# Patient Record
Sex: FEMALE | Race: WHITE | URBAN - METROPOLITAN AREA
[De-identification: names, ages, dates, MRNs, and addresses within clinical notes are randomized per-mention and may not be internally consistent; named-entity substitution may affect disease eponyms.]

---
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
## 2024-05-01 ENCOUNTER — APPOINTMENT (OUTPATIENT)
Dept: URBAN - METROPOLITAN AREA CLINIC 247 | Age: 19
Setting detail: DERMATOLOGY
End: 2024-05-01

## 2024-05-01 DIAGNOSIS — L74.51 PRIMARY FOCAL HYPERHIDROSIS: ICD-10-CM

## 2024-05-01 DIAGNOSIS — L70.0 ACNE VULGARIS: ICD-10-CM

## 2024-05-01 PROBLEM — L74.519 PRIMARY FOCAL HYPERHIDROSIS, UNSPECIFIED: Status: ACTIVE | Noted: 2024-05-01

## 2024-05-01 PROCEDURE — OTHER COUNSELING: OTHER

## 2024-05-01 PROCEDURE — OTHER PRESCRIPTION MEDICATION MANAGEMENT: OTHER

## 2024-05-01 PROCEDURE — 99203 OFFICE O/P NEW LOW 30 MIN: CPT

## 2024-05-01 PROCEDURE — OTHER MEDICATION COUNSELING: OTHER

## 2024-05-01 PROCEDURE — OTHER PRESCRIPTION: OTHER

## 2024-05-01 RX ORDER — KETOCONAZOLE 20 MG/G
CREAM TOPICAL
Qty: 60 | Refills: 11 | Status: CANCELLED | COMMUNITY
Start: 2024-05-01

## 2024-05-01 RX ORDER — TACROLIMUS 1 MG/G
OINTMENT TOPICAL
Qty: 60 | Refills: 11 | Status: ERX

## 2024-05-01 RX ORDER — TRETIONIN 0.25 MG/G
CREAM TOPICAL
Qty: 45 | Refills: 5 | Status: CANCELLED | COMMUNITY
Start: 2024-05-01

## 2024-05-01 RX ORDER — TACROLIMUS 1 MG/G
OINTMENT TOPICAL
Qty: 60 | Refills: 11 | Status: CANCELLED | COMMUNITY
Start: 2024-05-01

## 2024-05-01 RX ORDER — KETOCONAZOLE 20 MG/G
CREAM TOPICAL
Qty: 60 | Refills: 11 | Status: ERX

## 2024-05-01 RX ORDER — TRETIONIN 0.25 MG/G
CREAM TOPICAL
Qty: 45 | Refills: 5 | Status: ERX

## 2024-05-01 NOTE — PROCEDURE: PRESCRIPTION MEDICATION MANAGEMENT
Detail Level: Zone
Render In Strict Bullet Format?: No
Initiate Treatment: Ketoconazole cream BID \\nTretinoin .025% QHS\\nOTC Neutrogena stubborn body acne cleanser

## 2024-05-01 NOTE — PROCEDURE: COUNSELING
Detail Level: Detailed
Minocycline Pregnancy And Lactation Text: This medication is Pregnancy Category D and not consider safe during pregnancy. It is also excreted in breast milk.
Aklief counseling:  Patient advised to apply a pea-sized amount only at bedtime and wait 30 minutes after washing their face before applying.  If too drying, patient may add a non-comedogenic moisturizer.  The most commonly reported side effects including irritation, redness, scaling, dryness, stinging, burning, itching, and increased risk of sunburn.  The patient verbalized understanding of the proper use and possible adverse effects of retinoids.  All of the patient's questions and concerns were addressed.
Erythromycin Counseling:  I discussed with the patient the risks of erythromycin including but not limited to GI upset, allergic reaction, drug rash, diarrhea, increase in liver enzymes, and yeast infections.
Winlevi Counseling:  I discussed with the patient the risks of topical clascoterone including but not limited to erythema, scaling, itching, and stinging. Patient voiced their understanding.
Azithromycin Pregnancy And Lactation Text: This medication is considered safe during pregnancy and is also secreted in breast milk.
Doxycycline Counseling:  Patient counseled regarding possible photosensitivity and increased risk for sunburn.  Patient instructed to avoid sunlight, if possible.  When exposed to sunlight, patients should wear protective clothing, sunglasses, and sunscreen.  The patient was instructed to call the office immediately if the following severe adverse effects occur:  hearing changes, easy bruising/bleeding, severe headache, or vision changes.  The patient verbalized understanding of the proper use and possible adverse effects of doxycycline.  All of the patient's questions and concerns were addressed.
Tetracycline Counseling: Patient counseled regarding possible photosensitivity and increased risk for sunburn.  Patient instructed to avoid sunlight, if possible.  When exposed to sunlight, patients should wear protective clothing, sunglasses, and sunscreen.  The patient was instructed to call the office immediately if the following severe adverse effects occur:  hearing changes, easy bruising/bleeding, severe headache, or vision changes.  The patient verbalized understanding of the proper use and possible adverse effects of tetracycline.  All of the patient's questions and concerns were addressed. Patient understands to avoid pregnancy while on therapy due to potential birth defects.
Benzoyl Peroxide Pregnancy And Lactation Text: This medication is Pregnancy Category C. It is unknown if benzoyl peroxide is excreted in breast milk.
Bactrim Pregnancy And Lactation Text: This medication is Pregnancy Category D and is known to cause fetal risk.  It is also excreted in breast milk.
Topical Retinoid Pregnancy And Lactation Text: This medication is Pregnancy Category C. It is unknown if this medication is excreted in breast milk.
Topical Sulfur Applications Counseling: Topical Sulfur Counseling: Patient counseled that this medication may cause skin irritation or allergic reactions.  In the event of skin irritation, the patient was advised to reduce the amount of the drug applied or use it less frequently.   The patient verbalized understanding of the proper use and possible adverse effects of topical sulfur application.  All of the patient's questions and concerns were addressed.
Dapsone Counseling: I discussed with the patient the risks of dapsone including but not limited to hemolytic anemia, agranulocytosis, rashes, methemoglobinemia, kidney failure, peripheral neuropathy, headaches, GI upset, and liver toxicity.  Patients who start dapsone require monitoring including baseline LFTs and weekly CBCs for the first month, then every month thereafter.  The patient verbalized understanding of the proper use and possible adverse effects of dapsone.  All of the patient's questions and concerns were addressed.
Isotretinoin Pregnancy And Lactation Text: This medication is Pregnancy Category X and is considered extremely dangerous during pregnancy. It is unknown if it is excreted in breast milk.
Birth Control Pills Counseling: Birth Control Pill Counseling: I discussed with the patient the potential side effects of OCPs including but not limited to increased risk of stroke, heart attack, thrombophlebitis, deep venous thrombosis, hepatic adenomas, breast changes, GI upset, headaches, and depression.  The patient verbalized understanding of the proper use and possible adverse effects of OCPs. All of the patient's questions and concerns were addressed.
High Dose Vitamin A Pregnancy And Lactation Text: High dose vitamin A therapy is contraindicated during pregnancy and breast feeding.
Spironolactone Counseling: Patient advised regarding risks of diarrhea, abdominal pain, hyperkalemia, birth defects (for female patients), liver toxicity and renal toxicity. The patient may need blood work to monitor liver and kidney function and potassium levels while on therapy. The patient verbalized understanding of the proper use and possible adverse effects of spironolactone.  All of the patient's questions and concerns were addressed.
Azelaic Acid Pregnancy And Lactation Text: This medication is considered safe during pregnancy and breast feeding.
Topical Clindamycin Counseling: Patient counseled that this medication may cause skin irritation or allergic reactions.  In the event of skin irritation, the patient was advised to reduce the amount of the drug applied or use it less frequently.   The patient verbalized understanding of the proper use and possible adverse effects of clindamycin.  All of the patient's questions and concerns were addressed.
Winlevi Pregnancy And Lactation Text: This medication is considered safe during pregnancy and breastfeeding.
Bactrim Counseling:  I discussed with the patient the risks of sulfa antibiotics including but not limited to GI upset, allergic reaction, drug rash, diarrhea, dizziness, photosensitivity, and yeast infections.  Rarely, more serious reactions can occur including but not limited to aplastic anemia, agranulocytosis, methemoglobinemia, blood dyscrasias, liver or kidney failure, lung infiltrates or desquamative/blistering drug rashes.
Doxycycline Pregnancy And Lactation Text: This medication is Pregnancy Category D and not consider safe during pregnancy. It is also excreted in breast milk but is considered safe for shorter treatment courses.
Minocycline Counseling: Patient advised regarding possible photosensitivity and discoloration of the teeth, skin, lips, tongue and gums.  Patient instructed to avoid sunlight, if possible.  When exposed to sunlight, patients should wear protective clothing, sunglasses, and sunscreen.  The patient was instructed to call the office immediately if the following severe adverse effects occur:  hearing changes, easy bruising/bleeding, severe headache, or vision changes.  The patient verbalized understanding of the proper use and possible adverse effects of minocycline.  All of the patient's questions and concerns were addressed.
Erythromycin Pregnancy And Lactation Text: This medication is Pregnancy Category B and is considered safe during pregnancy. It is also excreted in breast milk.
Sarecycline Counseling: Patient advised regarding possible photosensitivity and discoloration of the teeth, skin, lips, tongue and gums.  Patient instructed to avoid sunlight, if possible.  When exposed to sunlight, patients should wear protective clothing, sunglasses, and sunscreen.  The patient was instructed to call the office immediately if the following severe adverse effects occur:  hearing changes, easy bruising/bleeding, severe headache, or vision changes.  The patient verbalized understanding of the proper use and possible adverse effects of sarecycline.  All of the patient's questions and concerns were addressed.
Aklief Pregnancy And Lactation Text: It is unknown if this medication is safe to use during pregnancy.  It is unknown if this medication is excreted in breast milk.  Breastfeeding women should use the topical cream on the smallest area of the skin for the shortest time needed while breastfeeding.  Do not apply to nipple and areola.
Tazorac Counseling:  Patient advised that medication is irritating and drying.  Patient may need to apply sparingly and wash off after an hour before eventually leaving it on overnight.  The patient verbalized understanding of the proper use and possible adverse effects of tazorac.  All of the patient's questions and concerns were addressed.
Topical Retinoid counseling:  Patient advised to apply a pea-sized amount only at bedtime and wait 30 minutes after washing their face before applying.  If too drying, patient may add a non-comedogenic moisturizer. The patient verbalized understanding of the proper use and possible adverse effects of retinoids.  All of the patient's questions and concerns were addressed.
Use Enhanced Medication Counseling?: No
Azithromycin Counseling:  I discussed with the patient the risks of azithromycin including but not limited to GI upset, allergic reaction, drug rash, diarrhea, and yeast infections.
Topical Clindamycin Pregnancy And Lactation Text: This medication is Pregnancy Category B and is considered safe during pregnancy. It is unknown if it is excreted in breast milk.
Azelaic Acid Counseling: Patient counseled that medicine may cause skin irritation and to avoid applying near the eyes.  In the event of skin irritation, the patient was advised to reduce the amount of the drug applied or use it less frequently.   The patient verbalized understanding of the proper use and possible adverse effects of azelaic acid.  All of the patient's questions and concerns were addressed.
Isotretinoin Counseling: Patient should get monthly blood tests, not donate blood, not drive at night if vision affected, not share medication, and not undergo elective surgery for 6 months after tx completed. Side effects reviewed, pt to contact office should one occur.
Birth Control Pills Pregnancy And Lactation Text: This medication should be avoided if pregnant and for the first 30 days post-partum.
Tazorac Pregnancy And Lactation Text: This medication is not safe during pregnancy. It is unknown if this medication is excreted in breast milk.
Dapsone Pregnancy And Lactation Text: This medication is Pregnancy Category C and is not considered safe during pregnancy or breast feeding.
Spironolactone Pregnancy And Lactation Text: This medication can cause feminization of the male fetus and should be avoided during pregnancy. The active metabolite is also found in breast milk.
Benzoyl Peroxide Counseling: Patient counseled that medicine may cause skin irritation and bleach clothing.  In the event of skin irritation, the patient was advised to reduce the amount of the drug applied or use it less frequently.   The patient verbalized understanding of the proper use and possible adverse effects of benzoyl peroxide.  All of the patient's questions and concerns were addressed.
High Dose Vitamin A Counseling: Side effects reviewed, pt to contact office should one occur.
Topical Sulfur Applications Pregnancy And Lactation Text: This medication is Pregnancy Category C and has an unknown safety profile during pregnancy. It is unknown if this topical medication is excreted in breast milk.
Medical Necessity Information: LCD Guidelines vary from payer to payer. Please check with your payer's policy to determine medical necessity.
Medical Necessity Clause: Botulinum toxin hyperhidrosis therapy is medically necessary because

## 2025-04-14 ENCOUNTER — LAB ENCOUNTER (OUTPATIENT)
Dept: LAB | Age: 20
End: 2025-04-14
Attending: OBSTETRICS & GYNECOLOGY
Payer: COMMERCIAL

## 2025-04-14 ENCOUNTER — OFFICE VISIT (OUTPATIENT)
Dept: OBGYN CLINIC | Facility: CLINIC | Age: 20
End: 2025-04-14
Payer: COMMERCIAL

## 2025-04-14 VITALS
WEIGHT: 104 LBS | BODY MASS INDEX: 20.42 KG/M2 | DIASTOLIC BLOOD PRESSURE: 64 MMHG | SYSTOLIC BLOOD PRESSURE: 110 MMHG | HEIGHT: 60 IN

## 2025-04-14 DIAGNOSIS — K64.9 HEMORRHOIDS, UNSPECIFIED HEMORRHOID TYPE: ICD-10-CM

## 2025-04-14 DIAGNOSIS — Z11.3 SCREENING EXAMINATION FOR STI: ICD-10-CM

## 2025-04-14 DIAGNOSIS — Z01.419 VISIT FOR GYNECOLOGIC EXAMINATION: ICD-10-CM

## 2025-04-14 DIAGNOSIS — Z01.419 VISIT FOR GYNECOLOGIC EXAMINATION: Primary | ICD-10-CM

## 2025-04-14 DIAGNOSIS — Z12.4 PAP SMEAR FOR CERVICAL CANCER SCREENING: ICD-10-CM

## 2025-04-14 LAB
HCV AB SERPL QL IA: NONREACTIVE
T PALLIDUM AB SER QL IA: NONREACTIVE

## 2025-04-14 PROCEDURE — 87389 HIV-1 AG W/HIV-1&-2 AB AG IA: CPT | Performed by: OBSTETRICS & GYNECOLOGY

## 2025-04-14 PROCEDURE — 87491 CHLMYD TRACH DNA AMP PROBE: CPT | Performed by: OBSTETRICS & GYNECOLOGY

## 2025-04-14 PROCEDURE — 87591 N.GONORRHOEAE DNA AMP PROB: CPT | Performed by: OBSTETRICS & GYNECOLOGY

## 2025-04-14 PROCEDURE — 86803 HEPATITIS C AB TEST: CPT | Performed by: OBSTETRICS & GYNECOLOGY

## 2025-04-14 PROCEDURE — 86780 TREPONEMA PALLIDUM: CPT | Performed by: OBSTETRICS & GYNECOLOGY

## 2025-04-14 RX ORDER — HYDROCORTISONE 25 MG/G
1 CREAM TOPICAL 2 TIMES DAILY
Qty: 28 G | Refills: 2 | Status: SHIPPED | OUTPATIENT
Start: 2025-04-14

## 2025-04-14 RX ORDER — BUPROPION HYDROCHLORIDE 150 MG/1
150 TABLET ORAL EVERY MORNING
COMMUNITY
Start: 2025-04-06

## 2025-04-14 RX ORDER — BUSPIRONE HYDROCHLORIDE 7.5 MG/1
7.5 TABLET ORAL 2 TIMES DAILY
COMMUNITY
Start: 2025-03-27

## 2025-04-14 NOTE — PROGRESS NOTES
ANNUAL GYN EXAM  [facility]    CHIEF COMPLAINT:    Chief Complaint   Patient presents with    Annual      HISTORY OF PRESENT ILLNESS:   Amberly Tsai is a 19 year old female   who presents for annual well woman visit.  She is feeling well.    Annual exam / pap smear    Patient's last menstrual period was 2025 (exact date).  Monthly / regular, 3-4 days, ligh flow, no cramps  Does cramp with ovulation    No sexual partner currently  Partners are typically men    About 3 years ago, had an itchy rash - wonders if it was genital herpes    Exercise: 2-3 times per week rock climbing  Diet: good  Mood: good, + h/o anxiety has been bad recently - medications help, has done therapy in the past  It was difficult for her to get off venlafaxine      PAST MEDICAL HISTORY:   Past Medical History[1]     PAST SURGICAL HISTORY:   Past Surgical History[2]     PAST OB HISTORY:  OB History    Para Term  AB Living   0 0 0 0 0 0   SAB IAB Ectopic Multiple Live Births   0 0 0 0 0       CURRENT MEDICATIONS:    Medications - Current[3]    ALLERGIES:  Allergies[4]    SOCIAL HISTORY:  Social Hx on file[5]    FAMILY HISTORY:  Family History[6]  ASSESSMENTS:  REVIEW OF SYSTEMS:  CONSTITUTIONAL:  negative for fevers, chills and sweats    EYES:  negative for  blurred vision and visual disturbance  RESPIRATORY:  negative for  cough and shortness of breath  CARDIOVASCULAR:  negative for  chest pain, palpitations  GASTROINTESTINAL:  No constipation/diarrhea, no pain  GENITOURINARY:  See History of Present Illness  INTEGUMENT/BREAST: Breast: no masses, no nipple discharge  ENDOCRINE:  negative for acne, constipation, diarrhea, cold intolerance, heat intolerance, fatigue, hair loss, weight gain and weight loss  MUSCULOSKELETAL:  negative for joint pain  NEUROLOGICAL:  negative for dizziness/lightheadedness and headaches  BEHAVIOR/PSYCH:  Negative for depressed mood, anhedonia and anxiety    PHYSICAL EXAM  Patient's last  menstrual period was 04/06/2025 (exact date).   Vitals:    04/14/25 1034   BP: 110/64   Weight: 104 lb (47.2 kg)   Height: 60\"       CONSTITUTIONAL: Awake, alert, cooperative, no apparent distress, and appears stated age   NECK: Supple, symmetrical, trachea midline, no adenopathy, thyroid symmetric, not enlarged and no tenderness  LUNGS: no excess work of breathing  ABDOMEN: Soft, non-distended, non-tender, no masses palpated    CHEST/BREASTS: Breasts symmetrical, skin without lesion(s), no nipple retraction or dimpling, no nipple discharge, no masses palpated, no axillary or supraclavicular adenopathy  GENITAL/URINARY:    External Genitalia:  General appearance; normal, Hair distribution; normal, Lesions absent   Urethral Meatus:  Lesions absent, Prolapse absent  Bladder:  Tenderness absent, Cystocele absent  Vagina:  Discharge absent, Lesions absent, Pelvic support normal  Cervix:  Lesions absent, Discharge absent, Tenderness absent  Uterus:  Size normal, Masses absent, Tenderness absent  Adnexa:  Masses absent, Tenderness absent  Anus/Perineum:  1 cm external hemorrhoid, no erythema/induration    MUSCULOSKELETAL: There is no redness, warmth, or swelling of the joints.  Tone is normal.  NEUROLOGIC: Patient is awake, alert and oriented to name, place and time. Casual gait is normal.  SKIN: no bruising or bleeding and no rashes  PSYCHIATRIC: Behavior:  Appropriate  Mood:  appropriate  ASSESSMENT AND PLAN:  1. Visit for gynecologic examination  - CBE and pelvic exam with pap / gccg today. Self breast awareness discussed.  - ThinPrep PAP with HPV Reflex Request; Future  - Chlamydia/GC PCR Combo; Future  - HIV AG AB Combo; Future  - TREP; Future  - HCV Antibody; Future    2. Pap smear for cervical cancer screening  - ThinPrep PAP with HPV Reflex Request; Future  - Chlamydia/GC PCR Combo; Future    3. Screening examination for STI  - HIV AG AB Combo; Future  - TREP; Future  - HCV Antibody; Future    4. Hemorrhoids,  unspecified hemorrhoid type  - recommend sitz baths, anusol cream rx sent. Discussed bowel habits, avoiding the need to push / strain with BM.  - GI referral placed.  - Gastro Referral - In Network       follow up 1 yr or as needed  Kristina Robert DO         [1]   Past Medical History:   Anxiety    Migraine headache   [2]   Past Surgical History:  Procedure Laterality Date    Patient denies any surgical history     [3]   Current Outpatient Medications:     busPIRone HCl 7.5 MG Oral Tab, Take 1 tablet (7.5 mg total) by mouth 2 (two) times daily., Disp: , Rfl:     buPROPion  MG Oral Tablet 24 Hr, Take 1 tablet (150 mg total) by mouth every morning., Disp: , Rfl:     hydrocortisone (ANUSOL-HC) 2.5 % External Cream, Place 1 g rectally 2 (two) times daily., Disp: 28 g, Rfl: 2    dicyclomine 20 MG Oral Tab, Take 1 tablet (20 mg total) by mouth 4 (four) times daily as needed. (Patient not taking: Reported on 4/14/2025), Disp: 40 tablet, Rfl: 0    ciprofloxacin HCl 0.3 % Ophthalmic Solution, Apply 1 drop to eye every 4 (four) hours. (Patient not taking: Reported on 4/14/2025), Disp: 1 each, Rfl: 0    ergocalciferol 90654 units Oral Cap, Take 1 capsule (50,000 Units total) by mouth once a week. For 6 weeks (Patient not taking: Reported on 4/14/2025), Disp: 6 capsule, Rfl: 0  [4] No Known Allergies  [5]   Social History  Socioeconomic History    Marital status: Single   Tobacco Use    Smoking status: Never    Smokeless tobacco: Never   Substance and Sexual Activity    Alcohol use: Never    Drug use: Never    Sexual activity: Not Currently   Other Topics Concern    Blood Transfusions No   [6]   Family History  Problem Relation Age of Onset    No Known Problems Father     Anxiety Mother     Anxiety Half-Brother     Anxiety Sister

## 2025-04-15 LAB
C TRACH DNA SPEC QL NAA+PROBE: NEGATIVE
N GONORRHOEA DNA SPEC QL NAA+PROBE: NEGATIVE

## 2025-07-16 NOTE — H&P
[facility] - Gastroenterology                                                                                                               Reason for consult: eval    Requesting physician or provider: Rachel Garica MD    Chief Complaint   Patient presents with    Consult     For hemorrhoids       HPI:   Amberly Tsai is a 19 year old year-old female with history of anxiety, migraines:    she is here today for evaluation    Hemorrhoids    She says has had chronic issues with hemorrhoids  Has had pain intermittently  She denies brbpr.    She has bm every 3 days. No straining. No diarrhea.  Has had bleeding at times.  No melena.      Has used sitzbaths, creams - minimal improvement in sx  Had gyne exam and as told had eh and referred to gi    she denies acid reflux and/or heartburn. she denies dysphagia, odynophagia and/or globus. she denies abdominal pain. she denies nausea and/or vomiting.  she denies recent change in appetite and/or unintentional weight loss.    NSAIDS: no  Tobacco: no  Alcohol: no  Marijuana: no  Illicit drugs: no    No FH GI malignancy, ibd    No history of adverse reaction to sedation  No KAYA  No anticoagulants  No pacemaker/defibrillator  No pain medications and/or sleep aides      Last colonoscopy: no  Last EGD: no    Wt Readings from Last 6 Encounters:   07/21/25 105 lb 14.4 oz (48 kg)   04/14/25 104 lb (47.2 kg)   10/22/21 107 lb (48.5 kg) (15%, Z= -1.03)*   09/24/21 107 lb (48.5 kg) (16%, Z= -1.01)*   07/22/21 111 lb (50.3 kg) (24%, Z= -0.70)*   03/26/18 100 lb 8 oz (45.6 kg) (29%, Z= -0.54)*     * Growth percentiles are based on CDC (Girls, 2-20 Years) data.        History, Medications, Allergies, ROS:      Past Medical History[1]   Past Surgical History[2]   Family Hx: Family History[3]   Social History: Short Social Hx on File[4]     Medications (Active prior to today's  visit):  Current Medications[5]    Allergies:  Allergies[6]    ROS:   CONSTITUTIONAL: negative for fevers, chills, sweats and weight loss  EYES Negative for red eyes, yellow eyes, changes in vision  HEENT: Negative for dysphagia and hoarseness  RESPIRATORY: Negative for cough and shortness of breath  CARDIOVASCULAR: Negative for chest pain, palpitations  GASTROINTESTINAL: See HPI  GENITOURINARY: Negative for dysuria and frequency  MUSCULOSKELETAL: Negative for arthralgias and myalgias  NEUROLOGICAL: Negative for dizziness and headaches  BEHAVIOR/PSYCH: Negative for anxiety and poor appetite    PHYSICAL EXAM:   Blood pressure 100/63, pulse 75, height 5' (1.524 m), weight 105 lb 14.4 oz (48 kg), last menstrual period 04/06/2025.    GEN: WD/WN, NAD  HEENT: Supple symmetrical, trachea midline  CV: RRR, the extremities are warm and well perfused   LUNGS: No increased work of breathing  ABDOMEN: No scars, normal bowel sounds, soft, non-tender, non-distended no rebound or guarding, no masses, no hepatomegaly  MSK: No redness, no warmth, no swelling of joints  SKIN: No jaundice, no erythema, no rashes  HEMATOLOGIC: No bleeding, no bruising  NEURO: Alert and interactive, normal gait    Labs/Imaging/Procedures:     Patient's pertinent labs and imaging were reviewed and discussed with patient today.        .  ASSESSMENT/PLAN:   Amberly Tsai is a 19 year old year-old female with history of anxiety, migraines    #hemorrhoids  #constipation  #rectal pain  Chronic issues with sx of pain and occasional brbpr.  Bm every 3 days and suspect constipation contributing.  No fhx gi malignancy, IBD. Has not had cln and we discussed procedure to exclude alt etiology of bleeding.     -fiber supplement  -miralax as needed  -squatty potty  -sitx baths  -anusol twice daily x 2 weeks  -labs  -surgery referral    1. Schedule colonoscopy with [facility] w/ general pool MD [Diagnosis: brbpr]    2.  bowel prep from pharmacy ([facility])    3.    For cardiology patients and patients on blood thinners:  Please contact your cardiology clinic for clearance to proceed with the endoscopic procedure. If you are on blood thinners, please also confirm with your cardiologic clinic that you are able to hold the blood thinner per our recommendations.\"    BLOOD THINNER ORDERS:  -Hold for 48 hours (Xarelto, Eliquis, Pradaxa, Savaysa)  -Hold for 3 days (Pletal)  -Hold for 5 days (Coumadin, Plavix, Brilinta, Aggrenox)  -Hold for 7 days (Effient)     For endocrinology insulin patients:    Please contact your endocrinology clinic for insulin adjustment orders prior to your endoscopic procedure.    4. Read all bowel prep instructions carefully. Bowel prep instructions can also be found online at:  [email or web address]     5. AVOID seeds, nuts, popcorn, raw fruits and vegetables for 3 days before procedure    6.  If you start any NEW medication after your visit today, please notify us. Certain medications (like iron or weight loss medications) will need to be held before the procedure, or the procedure cannot be performed safely.      Orders This Visit:  Orders Placed This Encounter   Procedures    CBC W Differential W Platelet    TSH (Assay, Thyroid Stim Hormone)    C-Reactive Protein       Meds This Visit:  Requested Prescriptions     Signed Prescriptions Disp Refills    hydrocortisone 2.5 % External Cream 30 g 0     Sig: Place 1 Application rectally 2 (two) times daily. Apply by topical route 2 times every day to the affected area(s)    polyethylene glycol, PEG 3350-KCl-NaBcb-NaCl-NaSulf, (GOLYTELY) 236 g Oral Recon Soln 4000 mL 0     Sig: Take 4,000 mL by mouth once for 1 dose.       Imaging & Referrals:  SURGERY - INTERNAL    ENDOSCOPIC RISK BENEFIT DISCUSSION: I described the procedure in great detail with the patient. I discussed the risks and benefits, including but not limited to: bleeding, perforation, infection, anesthesia complications, and even death. Patient  will be NPO after midnight and will have a person physically present at time of pick-up to drive patient home. Patient verbalized understanding and agrees to proceed with procedure as planned.    Jenny Mujica, APRN   7/16/2025        This note was partially prepared using Dragon Medical voice recognition dictation software. As a result, errors may occur. When identified, these errors have been corrected. While every attempt is made to correct errors during dictation, discrepancies may still exist.          [1]   Past Medical History:   Anxiety    Migraine headache   [2]   Past Surgical History:  Procedure Laterality Date    Patient denies any surgical history     [3]   Family History  Problem Relation Age of Onset    No Known Problems Father     Anxiety Mother     Anxiety Half-Brother     Anxiety Sister    [4]   Social History  Socioeconomic History    Marital status: Single   Tobacco Use    Smoking status: Never    Smokeless tobacco: Never   Substance and Sexual Activity    Alcohol use: Never    Drug use: Never    Sexual activity: Not Currently   Other Topics Concern    Blood Transfusions No     Social Drivers of Health     Food Insecurity: No Food Insecurity (4/14/2025)    NCSS - Food Insecurity     Worried About Running Out of Food in the Last Year: No     Ran Out of Food in the Last Year: No   Transportation Needs: No Transportation Needs (4/14/2025)    NCSS - Transportation     Lack of Transportation: No   Housing Stability: Not At Risk (4/14/2025)    NCSS - Housing/Utilities     Has Housing: Yes     Worried About Losing Housing: No     Unable to Get Utilities: No   [5]   Current Outpatient Medications   Medication Sig Dispense Refill    hydrocortisone 2.5 % External Cream Place 1 Application rectally 2 (two) times daily. Apply by topical route 2 times every day to the affected area(s) 30 g 0    polyethylene glycol, PEG 3350-KCl-NaBcb-NaCl-NaSulf, (GOLYTELY) 236 g Oral Recon Soln Take 4,000 mL by mouth  once for 1 dose. 4000 mL 0    buPROPion  MG Oral Tablet 24 Hr Take 1 tablet (150 mg total) by mouth every morning.      hydrocortisone (ANUSOL-HC) 2.5 % External Cream Place 1 g rectally 2 (two) times daily. 28 g 2    busPIRone HCl 7.5 MG Oral Tab Take 1 tablet (7.5 mg total) by mouth 2 (two) times daily.     [6] No Known Allergies

## 2025-07-21 ENCOUNTER — LAB ENCOUNTER (OUTPATIENT)
Dept: LAB | Facility: HOSPITAL | Age: 20
End: 2025-07-21
Attending: NURSE PRACTITIONER
Payer: COMMERCIAL

## 2025-07-21 ENCOUNTER — OFFICE VISIT (OUTPATIENT)
Facility: CLINIC | Age: 20
End: 2025-07-21

## 2025-07-21 VITALS
BODY MASS INDEX: 20.79 KG/M2 | SYSTOLIC BLOOD PRESSURE: 100 MMHG | HEART RATE: 75 BPM | WEIGHT: 105.88 LBS | DIASTOLIC BLOOD PRESSURE: 63 MMHG | HEIGHT: 60 IN

## 2025-07-21 DIAGNOSIS — K62.5 BRBPR (BRIGHT RED BLOOD PER RECTUM): Primary | ICD-10-CM

## 2025-07-21 DIAGNOSIS — K62.89 RECTAL PAIN: ICD-10-CM

## 2025-07-21 DIAGNOSIS — K59.00 CONSTIPATION, UNSPECIFIED CONSTIPATION TYPE: ICD-10-CM

## 2025-07-21 DIAGNOSIS — K62.5 BRBPR (BRIGHT RED BLOOD PER RECTUM): ICD-10-CM

## 2025-07-21 LAB
BASOPHILS # BLD AUTO: 0.08 X10(3) UL (ref 0–0.2)
BASOPHILS NFR BLD AUTO: 1.2 %
CRP SERPL-MCNC: <0.5 MG/DL (ref ?–0.5)
DEPRECATED RDW RBC AUTO: 38.5 FL (ref 35.1–46.3)
EOSINOPHIL # BLD AUTO: 0.37 X10(3) UL (ref 0–0.7)
EOSINOPHIL NFR BLD AUTO: 5.5 %
ERYTHROCYTE [DISTWIDTH] IN BLOOD BY AUTOMATED COUNT: 11.4 % (ref 11–15)
HCT VFR BLD AUTO: 37.7 % (ref 35–48)
HGB BLD-MCNC: 12.5 G/DL (ref 12–16)
IMM GRANULOCYTES # BLD AUTO: 0.01 X10(3) UL (ref 0–1)
IMM GRANULOCYTES NFR BLD: 0.1 %
LYMPHOCYTES # BLD AUTO: 1.59 X10(3) UL (ref 1–4)
LYMPHOCYTES NFR BLD AUTO: 23.8 %
MCH RBC QN AUTO: 30.3 PG (ref 26–34)
MCHC RBC AUTO-ENTMCNC: 33.2 G/DL (ref 31–37)
MCV RBC AUTO: 91.5 FL (ref 80–100)
MONOCYTES # BLD AUTO: 0.58 X10(3) UL (ref 0.1–1)
MONOCYTES NFR BLD AUTO: 8.7 %
NEUTROPHILS # BLD AUTO: 4.06 X10 (3) UL (ref 1.5–7.7)
NEUTROPHILS # BLD AUTO: 4.06 X10(3) UL (ref 1.5–7.7)
NEUTROPHILS NFR BLD AUTO: 60.7 %
PLATELET # BLD AUTO: 355 10(3)UL (ref 150–450)
RBC # BLD AUTO: 4.12 X10(6)UL (ref 3.8–5.3)
TSI SER-ACNC: 0.51 UIU/ML (ref 0.55–4.78)
WBC # BLD AUTO: 6.7 X10(3) UL (ref 4–11)

## 2025-07-21 PROCEDURE — 36415 COLL VENOUS BLD VENIPUNCTURE: CPT

## 2025-07-21 PROCEDURE — 3008F BODY MASS INDEX DOCD: CPT | Performed by: NURSE PRACTITIONER

## 2025-07-21 PROCEDURE — 3074F SYST BP LT 130 MM HG: CPT | Performed by: NURSE PRACTITIONER

## 2025-07-21 PROCEDURE — 86140 C-REACTIVE PROTEIN: CPT

## 2025-07-21 PROCEDURE — 99204 OFFICE O/P NEW MOD 45 MIN: CPT | Performed by: NURSE PRACTITIONER

## 2025-07-21 PROCEDURE — 85025 COMPLETE CBC W/AUTO DIFF WBC: CPT

## 2025-07-21 PROCEDURE — 84443 ASSAY THYROID STIM HORMONE: CPT

## 2025-07-21 PROCEDURE — 3078F DIAST BP <80 MM HG: CPT | Performed by: NURSE PRACTITIONER

## 2025-07-21 RX ORDER — POLYETHYLENE GLYCOL 3350, SODIUM SULFATE ANHYDROUS, SODIUM BICARBONATE, SODIUM CHLORIDE, POTASSIUM CHLORIDE 236; 22.74; 6.74; 5.86; 2.97 G/4L; G/4L; G/4L; G/4L; G/4L
4000 POWDER, FOR SOLUTION ORAL ONCE
Qty: 4000 ML | Refills: 0 | Status: SHIPPED | OUTPATIENT
Start: 2025-07-21 | End: 2025-07-21

## 2025-07-21 RX ORDER — HYDROCORTISONE 25 MG/G
1 CREAM TOPICAL 2 TIMES DAILY
Qty: 30 G | Refills: 0 | Status: SHIPPED | OUTPATIENT
Start: 2025-07-21

## 2025-07-21 NOTE — PATIENT INSTRUCTIONS
-fiber supplement  -miralax as needed  -squatty potty  -sitx baths  -anusol twice daily x 2 weeks  -labs  -surgery referral    1. Schedule colonoscopy with [facility] w/ general pool MD [Diagnosis: brbpr]    2.  bowel prep from pharmacy (split [facility])    3.   For cardiology patients and patients on blood thinners:  Please contact your cardiology clinic for clearance to proceed with the endoscopic procedure. If you are on blood thinners, please also confirm with your cardiologic clinic that you are able to hold the blood thinner per our recommendations.\"    BLOOD THINNER ORDERS:  -Hold for 48 hours (Xarelto, Eliquis, Pradaxa, Savaysa)  -Hold for 3 days (Pletal)  -Hold for 5 days (Coumadin, Plavix, Brilinta, Aggrenox)  -Hold for 7 days (Effient)     For endocrinology insulin patients:    Please contact your endocrinology clinic for insulin adjustment orders prior to your endoscopic procedure.    4. Read all bowel prep instructions carefully. Bowel prep instructions can also be found online at:  [email or web address]     5. AVOID seeds, nuts, popcorn, raw fruits and vegetables for 3 days before procedure    6.  If you start any NEW medication after your visit today, please notify us. Certain medications (like iron or weight loss medications) will need to be held before the procedure, or the procedure cannot be performed safely.